# Patient Record
Sex: MALE | Race: WHITE | Employment: FULL TIME | URBAN - METROPOLITAN AREA
[De-identification: names, ages, dates, MRNs, and addresses within clinical notes are randomized per-mention and may not be internally consistent; named-entity substitution may affect disease eponyms.]

---

## 2023-04-19 PROBLEM — I47.10 SVT (SUPRAVENTRICULAR TACHYCARDIA): Status: ACTIVE | Noted: 2023-04-19

## 2024-01-19 ENCOUNTER — OFFICE VISIT (OUTPATIENT)
Age: 34
End: 2024-01-19
Payer: COMMERCIAL

## 2024-01-19 VITALS
DIASTOLIC BLOOD PRESSURE: 74 MMHG | SYSTOLIC BLOOD PRESSURE: 110 MMHG | HEIGHT: 73 IN | HEART RATE: 64 BPM | WEIGHT: 189 LBS | BODY MASS INDEX: 25.05 KG/M2

## 2024-01-19 DIAGNOSIS — I47.10 SVT (SUPRAVENTRICULAR TACHYCARDIA): Primary | ICD-10-CM

## 2024-01-19 PROCEDURE — 99214 OFFICE O/P EST MOD 30 MIN: CPT | Performed by: INTERNAL MEDICINE

## 2024-01-19 NOTE — PROGRESS NOTES
Smokeless tobacco: Former   Substance Use Topics    Alcohol use: Not on file         ROS:    No obvious pertinent positives on review of systems except for what was outlined in the HPI today.      PHYSICAL EXAM:     /74   Pulse 64   Ht 1.854 m (6' 1\")   Wt 85.7 kg (189 lb)   BMI 24.94 kg/m²    General/Constitutional:   Alert and oriented x 3, no acute distress  HEENT:   normocephalic, atraumatic, moist mucous membranes  Neck:   No JVD or carotid bruits bilaterally  Cardiovascular:   regular rate and rhythm, no murmur/rub/gallop appreciated  Pulmonary:   clear to auscultation bilaterally, no respiratory distress  Abdomen:   soft, non-tender, non-distended  Ext:   No sig LE edema bilaterally  Skin:  warm and dry, no obvious rashes seen  Neuro:   no obvious sensory or motor deficits  Psychiatric:   normal mood and affect      No results found for: \"NA\", \"K\", \"CL\", \"CO2\", \"BUN\", \"CREATININE\", \"GLUCOSE\", \"CALCIUM\"     No results found for: \"WBC\", \"HGB\", \"HCT\", \"MCV\", \"PLT\"    No results found for: \"TSHFT4\", \"TSH\"    No results found for: \"LABA1C\"  No results found for: \"EAG\"    No results found for: \"CHOL\"  No results found for: \"TRIG\"  No results found for: \"HDL\"  No results found for: \"LDLCHOLESTEROL\", \"LDLCALC\"  No results found for: \"LABVLDL\", \"VLDL\"  No results found for: \"CHOLHDLRATIO\"      No valid procedures specified.      I have Independently reviewed prior care notes, any ER records available, cardiac testing, labs and results with the patient and before seeing the patient today.  Also independently reviewed outside records when available.       ASSESSMENT:    Theodore was seen today for irregular heart beat.    Diagnoses and all orders for this visit:    SVT (supraventricular tachycardia)          PLAN:     SVT:  PRN diltiazem.  Work on stress mgmt.   Consider EPS and ablation for more sx.  He will let us know as reviewed.   GERD: on PPI, follow.    Anxiety:  on lexapro.    The patient has been

## 2024-10-02 ENCOUNTER — APPOINTMENT (RX ONLY)
Dept: URBAN - METROPOLITAN AREA CLINIC 23 | Facility: CLINIC | Age: 34
Setting detail: DERMATOLOGY
End: 2024-10-02

## 2024-10-02 DIAGNOSIS — Z80.8 FAMILY HISTORY OF MALIGNANT NEOPLASM OF OTHER ORGANS OR SYSTEMS: ICD-10-CM

## 2024-10-02 DIAGNOSIS — D22 MELANOCYTIC NEVI: ICD-10-CM

## 2024-10-02 DIAGNOSIS — B07.8 OTHER VIRAL WARTS: ICD-10-CM

## 2024-10-02 DIAGNOSIS — L57.8 OTHER SKIN CHANGES DUE TO CHRONIC EXPOSURE TO NONIONIZING RADIATION: ICD-10-CM

## 2024-10-02 PROBLEM — D22.71 MELANOCYTIC NEVI OF RIGHT LOWER LIMB, INCLUDING HIP: Status: ACTIVE | Noted: 2024-10-02

## 2024-10-02 PROCEDURE — ? COUNSELING

## 2024-10-02 PROCEDURE — ? PRESCRIPTION

## 2024-10-02 PROCEDURE — 99203 OFFICE O/P NEW LOW 30 MIN: CPT | Mod: 25

## 2024-10-02 PROCEDURE — ? ADDITIONAL NOTES

## 2024-10-02 PROCEDURE — 17110 DESTRUCTION B9 LES UP TO 14: CPT

## 2024-10-02 PROCEDURE — ? OBSERVATION AND MEASURE

## 2024-10-02 PROCEDURE — ? LIQUID NITROGEN

## 2024-10-02 RX ORDER — IMIQUIMOD 12.5 MG/.25G
CREAM TOPICAL
Qty: 1 | Refills: 0 | Status: CANCELLED
Stop reason: CLARIF

## 2024-10-02 RX ORDER — IMIQUIMOD 12.5 MG/.25G
CREAM TOPICAL
Qty: 12 | Refills: 2 | Status: ERX | COMMUNITY
Start: 2024-10-02

## 2024-10-02 RX ADMIN — IMIQUIMOD: 12.5 CREAM TOPICAL at 00:00

## 2024-10-02 ASSESSMENT — LOCATION ZONE DERM
LOCATION ZONE: EAR
LOCATION ZONE: TOE
LOCATION ZONE: ARM
LOCATION ZONE: TOE
LOCATION ZONE: NECK
LOCATION ZONE: HAND

## 2024-10-02 ASSESSMENT — LOCATION DETAILED DESCRIPTION DERM
LOCATION DETAILED: LEFT MEDIAL TRAPEZIAL NECK
LOCATION DETAILED: RIGHT SUPERIOR CRUS OF ANTIHELIX
LOCATION DETAILED: LEFT VENTRAL DISTAL FOREARM
LOCATION DETAILED: RIGHT RADIAL PALM
LOCATION DETAILED: RIGHT POSTERIOR NECK
LOCATION DETAILED: RIGHT DORSAL 3RD TOE
LOCATION DETAILED: RIGHT DORSAL 3RD TOE
LOCATION DETAILED: RIGHT SCAPHA
LOCATION DETAILED: LEFT DISTAL DORSAL FOREARM

## 2024-10-02 ASSESSMENT — LOCATION SIMPLE DESCRIPTION DERM
LOCATION SIMPLE: RIGHT HAND
LOCATION SIMPLE: LEFT FOREARM
LOCATION SIMPLE: RIGHT 3RD TOE
LOCATION SIMPLE: RIGHT 3RD TOE
LOCATION SIMPLE: POSTERIOR NECK
LOCATION SIMPLE: RIGHT EAR

## 2024-10-02 NOTE — PROCEDURE: ADDITIONAL NOTES
Render Risk Assessment In Note?: no
Detail Level: Simple
Additional Notes: Recommend full body exam due to family hx MM

## 2024-10-02 NOTE — PROCEDURE: LIQUID NITROGEN
Render Post-Care Instructions In Note?: no
Show Topical Anesthesia Variable?: Yes
Post-Care Instructions: I reviewed with the patient in detail post-care instructions. Patient is to wear sunprotection, and avoid picking at any of the treated lesions. Pt may apply Vaseline to crusted or scabbing areas.
Detail Level: Detailed
Consent: The patient's consent was obtained including but not limited to risks of crusting, scabbing, blistering, scarring, darker or lighter pigmentary change, recurrence, incomplete removal and infection.
Medical Necessity Information: It is in your best interest to select a reason for this procedure from the list below. All of these items fulfill various CMS LCD requirements except the new and changing color options.
Spray Paint Text: The liquid nitrogen was applied to the skin utilizing a spray paint frosting technique.
Medical Necessity Clause: This procedure was medically necessary because the lesions that were treated were:

## 2024-10-02 NOTE — PROCEDURE: COUNSELING
Detail Level: Detailed
Patient Specific Counseling (Will Not Stick From Patient To Patient): Discussed options removal vs ln2 , pt prefers ln2

## 2025-01-02 ENCOUNTER — APPOINTMENT (OUTPATIENT)
Dept: URBAN - METROPOLITAN AREA CLINIC 23 | Facility: CLINIC | Age: 35
Setting detail: DERMATOLOGY
End: 2025-01-02

## 2025-01-02 DIAGNOSIS — Z80.8 FAMILY HISTORY OF MALIGNANT NEOPLASM OF OTHER ORGANS OR SYSTEMS: ICD-10-CM

## 2025-01-02 DIAGNOSIS — B07.8 OTHER VIRAL WARTS: ICD-10-CM

## 2025-01-02 DIAGNOSIS — D22 MELANOCYTIC NEVI: ICD-10-CM

## 2025-01-02 DIAGNOSIS — Z71.89 OTHER SPECIFIED COUNSELING: ICD-10-CM

## 2025-01-02 DIAGNOSIS — L57.8 OTHER SKIN CHANGES DUE TO CHRONIC EXPOSURE TO NONIONIZING RADIATION: ICD-10-CM

## 2025-01-02 PROBLEM — D22.71 MELANOCYTIC NEVI OF RIGHT LOWER LIMB, INCLUDING HIP: Status: ACTIVE | Noted: 2025-01-02

## 2025-01-02 PROBLEM — D23.72 OTHER BENIGN NEOPLASM OF SKIN OF LEFT LOWER LIMB, INCLUDING HIP: Status: ACTIVE | Noted: 2025-01-02

## 2025-01-02 PROBLEM — D22.4 MELANOCYTIC NEVI OF SCALP AND NECK: Status: ACTIVE | Noted: 2025-01-02

## 2025-01-02 PROBLEM — D22.5 MELANOCYTIC NEVI OF TRUNK: Status: ACTIVE | Noted: 2025-01-02

## 2025-01-02 PROCEDURE — 99213 OFFICE O/P EST LOW 20 MIN: CPT | Mod: 25

## 2025-01-02 PROCEDURE — ? SUNSCREEN RECOMMENDATIONS

## 2025-01-02 PROCEDURE — ? LIQUID NITROGEN

## 2025-01-02 PROCEDURE — ? OBSERVATION AND MEASURE

## 2025-01-02 PROCEDURE — ? PRESCRIPTION MEDICATION MANAGEMENT

## 2025-01-02 PROCEDURE — 17110 DESTRUCTION B9 LES UP TO 14: CPT

## 2025-01-02 PROCEDURE — ? COUNSELING

## 2025-01-02 ASSESSMENT — LOCATION SIMPLE DESCRIPTION DERM
LOCATION SIMPLE: RIGHT FOREARM
LOCATION SIMPLE: ABDOMEN
LOCATION SIMPLE: LEFT FOREARM
LOCATION SIMPLE: RIGHT UPPER BACK
LOCATION SIMPLE: RIGHT EAR
LOCATION SIMPLE: RIGHT 3RD TOE
LOCATION SIMPLE: RIGHT HAND
LOCATION SIMPLE: POSTERIOR NECK
LOCATION SIMPLE: RIGHT 3RD TOE
LOCATION SIMPLE: RIGHT ANTERIOR NECK

## 2025-01-02 ASSESSMENT — LOCATION DETAILED DESCRIPTION DERM
LOCATION DETAILED: RIGHT POSTERIOR NECK
LOCATION DETAILED: LEFT PROXIMAL DORSAL FOREARM
LOCATION DETAILED: RIGHT SUPERIOR CRUS OF ANTIHELIX
LOCATION DETAILED: RIGHT PROXIMAL DORSAL FOREARM
LOCATION DETAILED: LEFT LATERAL ABDOMEN
LOCATION DETAILED: RIGHT DORSAL 3RD TOE
LOCATION DETAILED: RIGHT CLAVICULAR NECK
LOCATION DETAILED: RIGHT SUPERIOR UPPER BACK
LOCATION DETAILED: RIGHT DORSAL 3RD TOE
LOCATION DETAILED: RIGHT ULNAR PALM

## 2025-01-02 ASSESSMENT — LOCATION ZONE DERM
LOCATION ZONE: HAND
LOCATION ZONE: TRUNK
LOCATION ZONE: TOE
LOCATION ZONE: EAR
LOCATION ZONE: ARM
LOCATION ZONE: TOE
LOCATION ZONE: NECK

## 2025-01-02 NOTE — PROCEDURE: PRESCRIPTION MEDICATION MANAGEMENT
Render In Strict Bullet Format?: No
Detail Level: Zone
Initiate Treatment: Imiquimod 5% cream (Apply to warts qhs.)

## 2025-04-15 ENCOUNTER — OFFICE VISIT (OUTPATIENT)
Dept: ORTHOPEDIC SURGERY | Age: 35
End: 2025-04-15
Payer: COMMERCIAL

## 2025-04-15 VITALS — BODY MASS INDEX: 23.86 KG/M2 | HEIGHT: 73 IN | WEIGHT: 180 LBS

## 2025-04-15 DIAGNOSIS — M25.552 LEFT HIP PAIN: Primary | ICD-10-CM

## 2025-04-15 DIAGNOSIS — M25.852 FEMOROACETABULAR IMPINGEMENT OF LEFT HIP: ICD-10-CM

## 2025-04-15 PROBLEM — F41.1 GAD (GENERALIZED ANXIETY DISORDER): Status: ACTIVE | Noted: 2021-05-19

## 2025-04-15 PROBLEM — F41.9 ANXIETY: Status: ACTIVE | Noted: 2025-04-15

## 2025-04-15 PROBLEM — G47.33 OSA (OBSTRUCTIVE SLEEP APNEA): Status: ACTIVE | Noted: 2023-01-11

## 2025-04-15 PROCEDURE — 99204 OFFICE O/P NEW MOD 45 MIN: CPT | Performed by: ORTHOPAEDIC SURGERY

## 2025-04-15 RX ORDER — DESVENLAFAXINE 100 MG/1
100 TABLET, EXTENDED RELEASE ORAL DAILY
COMMUNITY
Start: 2025-01-08 | End: 2025-10-08

## 2025-04-15 NOTE — PROGRESS NOTES
Name: Theodore Huff  YOB: 1990  Gender: male  MRN: [id number]    CC: Hip Pain (L)     HPI: Theodore Huff is a 34 y.o. male who presents with Hip Pain (L)  He is a new patient coming in with left hip pain for 1-2 months. He reports no injury. He feels like his hip pops at times feels like the hip joint is stuck at times.  He states when he is going from a sitting to a standing position he notes snapping in his hip. He has had no formal treatment. He PCP ordered an MRI of his pelvis after getting a CT and noticing a cyst around his labrum.  He points to pain along his hip flexors.  He states that this pain does not affect his golf game or biking.    He does note when he runs the hip gets inflamed.  He notes he is not taking anything for pain because is not that painful.  He denies any pain on the lateral aspect of his hip.  He is  to Jamie Jaramillo's cousin who referred him to us for evaluation.    ROS/Meds/PSH/PMH/FH/SH: I personally reviewed the patients standard intake form.  Below are the pertinents    Tobacco:  reports that he has never smoked. He has never been exposed to tobacco smoke. He has quit using smokeless tobacco.  Diabetes: none  Other: History of SVTs, anxiety    Physical Examination:  General: no acute distress  Lungs: breathing easily  CV: regular rhythm by pulse  Left Hip: No pain with logroll.  5-5 abduction adduction strength.  No pain with JONAS.  Minimal pain with FADIR.  No pain with deep hip flexion.  Tender palpate over his hip flexors. He can perform straight leg raise without pain.  Calf soft nontender.  Dorsiflexion intact.  Dorsalis pedis pulse 2+.  Dorsi and plantarflexion intact.    Imaging:      Hip/pelvis XR: FLAVIO 4 views     Clinical Indication  1. Left hip pain    2. Femoroacetabular impingement of left hip           Report: AP, lin and frog lateral, false profile x-ray of the Left hip demonstrates a large cam lesion.  Has an alpha angle of about 70

## 2025-06-05 ENCOUNTER — PATIENT MESSAGE (OUTPATIENT)
Dept: ORTHOPEDIC SURGERY | Age: 35
End: 2025-06-05

## 2025-07-14 NOTE — PROGRESS NOTES
Name: Theodore Huff  YOB: 1990  Gender: male  MRN: [id number]      CC: Hip Pain (L)     HPI: Theodore Huff is a 35 y.o. male who returns for follow up and MRI results on  his left hip. He has not had much pain over the last few months.  He does report some back pain especially with increased activity he did not attend physical therapy. He is here to review MRI results and talk about next steps.         Physical Examination:  General: no acute distress  Lungs: breathing easily  CV: regular rhythm by pulse  Left Hip: He does have some tenderness palpation of the iliopsoas and anteriorly.  No inguinal tenderness to palpation today no tenderness of the pubic tubercle and the rectus insertion.  He does have some discomfort with FADIR and JONAS and Stinchfield which is mild today.    Imaging:   I reviewed MRI of the hip on the 3T scanner from 8/28/2025 which shows anterior superior acetabular labral tearing with paralabral cyst that is large at 3.4 cm.  Cam deformity that correlates with plain radiographs  All imaging interpreted by myself Pop Muñoz MD independent of radiology review    Assessment:     ICD-10-CM    1. Femoroacetabular impingement of left hip  M25.852 Ambulatory referral to Physical Therapy      2. Tear of left acetabular labrum, initial encounter  S73.192A Ambulatory referral to Physical Therapy           Plan:   We reviewed his MRI does have very impressive labrum paralabral cyst as well as impingement.  He does have bilateral inguinal hernia and is scheduled for repair of those on August 20 by the general surgeon.  I do think he has some symptoms from his ileus so I's and hip flexor and extra-articular musculature.  I would like for him to attend physical therapy to try to address this.  We also discussed surgical intervention which I would recommend given the large paralabral cyst and labral pathology and potential worsening in degenerative changes from that.  He will

## 2025-07-16 ENCOUNTER — OFFICE VISIT (OUTPATIENT)
Dept: ORTHOPEDIC SURGERY | Age: 35
End: 2025-07-16
Payer: COMMERCIAL

## 2025-07-16 DIAGNOSIS — M25.852 FEMOROACETABULAR IMPINGEMENT OF LEFT HIP: Primary | ICD-10-CM

## 2025-07-16 DIAGNOSIS — S73.192A TEAR OF LEFT ACETABULAR LABRUM, INITIAL ENCOUNTER: ICD-10-CM

## 2025-07-16 PROCEDURE — 99214 OFFICE O/P EST MOD 30 MIN: CPT | Performed by: ORTHOPAEDIC SURGERY

## 2025-07-22 ENCOUNTER — EVALUATION (OUTPATIENT)
Age: 35
End: 2025-07-22

## 2025-07-22 DIAGNOSIS — S73.192A TEAR OF LEFT ACETABULAR LABRUM, INITIAL ENCOUNTER: ICD-10-CM

## 2025-07-22 DIAGNOSIS — M25.552 HIP PAIN, LEFT: Primary | ICD-10-CM

## 2025-07-22 DIAGNOSIS — M25.852 FEMOROACETABULAR IMPINGEMENT OF LEFT HIP: ICD-10-CM

## 2025-07-22 DIAGNOSIS — M62.81 MUSCLE WEAKNESS: ICD-10-CM

## 2025-07-22 NOTE — PROGRESS NOTES
Frequently (51-75%)  Description: aching and sharp  Aggravating factors:lifting leg to don shoe, into car  Alleviating factors: activity mod    Neuro screen: denies numbness, tingling, and radiating pain    Social/Functional Hx:  How would you rate your overall health? very good  Pt lives with family in a(n) 1 story house with entry steps.   Current DME: none  Work Status: Employed full time: ; driving/social activity  PLOF & Social Hx/Interests: Independent and active without physical limitations and participated in working out, golfing  How much have your symptoms interfered with daily activities? Moderately  Current level of function: modified independent with pain w/ lifting and squatting    Patient Stated Goals: reduce pain w/ daily activity prior to hernia sx    Medications: reviewed in chart      OBJECTIVE EXAMINATION     Functional Outcome Questionnaire:   Hip Outcome Score (ADLs): 59 / 68 = 86.8 %   Observation:     Swelling/Edema: none  Palpation: TTP psoas, lumbar paraspinals and QL L      A/PROM Measures:    7/22/2025  Right 7/22/2025  Left Comment   Hip Flexion 125 125    Hip Abduction 35 35    Hip IR 30 25    Hip ER 45 45      Strength/MMT (0-5 Scale):   7/22/2025  Right 7/22/2025  Left Comment   Knee Flexion 5 5    Knee Extension 5 5    Hip Flexion 5 4    Hip Extension 5 4+    Hip Abduction 5 4      Special Tests/Function:   Gait: no gross deviations  Stair management:reciprocal ascending/descending  Squat: pain w/ depth  Balance: equal austin (SLS)    Special tests:   + Garfield test  + hip impingement    Treatment provided today consisted of initial evaluation followed by:    Therapeutic exercise (75975) x 25 min to address ROM/strength deficits and to develop an initial HEP as noted below.    Current Exercises Past Exercises (not performed today)   Half kneel hip flexor stretch 5x10s  Garfield stretch 3x  Qped rocking 10x  Seated combined hip IR/ER rotation stretch 10x  Patient

## 2025-08-01 ENCOUNTER — TREATMENT (OUTPATIENT)
Age: 35
End: 2025-08-01
Payer: COMMERCIAL

## 2025-08-01 DIAGNOSIS — M62.81 MUSCLE WEAKNESS: ICD-10-CM

## 2025-08-01 DIAGNOSIS — M25.552 HIP PAIN, LEFT: Primary | ICD-10-CM

## 2025-08-01 PROCEDURE — 97140 MANUAL THERAPY 1/> REGIONS: CPT | Performed by: PHYSICAL THERAPIST

## 2025-08-01 PROCEDURE — 97110 THERAPEUTIC EXERCISES: CPT | Performed by: PHYSICAL THERAPIST

## 2025-08-01 NOTE — PROGRESS NOTES
Abduction 35 35    Hip IR 30 25    Hip ER 45 45      Strength/MMT (0-5 Scale):   7/22/2025  Right 7/22/2025  Left Comment   Knee Flexion 5 5    Knee Extension 5 5    Hip Flexion 5 4    Hip Extension 5 4+    Hip Abduction 5 4      Treatment provided today consisted of:    Therapeutic exercise (60391) x 30 min to address ROM/strength deficits.    Current Exercises Past Exercises (not performed today)   Bike 5' Lv 3  Glute med press on wall w/ SL squat 2x8  Seated combined hip IR/ER rotation stretch 15x  Qped hip ER iso holds gry band [id number]  Bird dog w/ hip ER w/ foot on wall 3x10  Patient Education on the condition/pathology, involved anatomy, and exercise rationale.  Discussed soft tissue pain tx and activity modification.  Provided and reviewed HEP Half kneel hip flexor stretch 5x10s  Garfield stretch 3x  Qped rocking 10x     Manual therapy (02115) x 10 min utilizing techniques to improve joint and/or soft tissue mobility, ROM, and function as well as helping to decrease pain/spasms and swelling.  Palpation and assessment of soft tissue, muscles, and landmarks   Gr III lateral and PA hip mobs w/ belt          ASSESSMENT     Pt has good response to manual tx w/ improvement of squatting w/ less pinch at hip.   He has fatigue in lateral rotators and glute specific strengthening today and HEP was updated to work on these isometric strengthening.  Will fu 1x more to determine soft tissue relief of pain and will d/c as pt has procedure for hernia repair    PLAN OF CARE     Assess soft tissue referral pain; DN as needed    Effective Dates/Duration: 7/22/2025 TO 9/4/2025  (42 days).    Frequency: 2x/week ;  pt initially planning for 2 visits for symptom relief  Interventions may include but are not limited to:   (42993) Therapeutic exercise to develop ROM, strength, endurance and flexibility  (79282) Therapeutic activities using dynamic activities to improve function  (44106) Manual therapy techniques to improve joint

## 2025-08-14 ENCOUNTER — TREATMENT (OUTPATIENT)
Age: 35
End: 2025-08-14

## 2025-08-14 DIAGNOSIS — M62.81 MUSCLE WEAKNESS: ICD-10-CM

## 2025-08-14 DIAGNOSIS — M25.552 HIP PAIN, LEFT: Primary | ICD-10-CM

## 2025-08-14 DIAGNOSIS — M25.852 FEMOROACETABULAR IMPINGEMENT OF LEFT HIP: ICD-10-CM
